# Patient Record
Sex: MALE | Race: WHITE | NOT HISPANIC OR LATINO | Employment: FULL TIME | URBAN - METROPOLITAN AREA
[De-identification: names, ages, dates, MRNs, and addresses within clinical notes are randomized per-mention and may not be internally consistent; named-entity substitution may affect disease eponyms.]

---

## 2021-11-05 ENCOUNTER — TRANSFERRED RECORDS (OUTPATIENT)
Dept: HEALTH INFORMATION MANAGEMENT | Facility: CLINIC | Age: 26
End: 2021-11-05
Payer: OTHER MISCELLANEOUS

## 2021-11-06 ENCOUNTER — MEDICAL CORRESPONDENCE (OUTPATIENT)
Dept: HEALTH INFORMATION MANAGEMENT | Facility: CLINIC | Age: 26
End: 2021-11-06
Payer: OTHER MISCELLANEOUS

## 2021-11-07 ENCOUNTER — MEDICAL CORRESPONDENCE (OUTPATIENT)
Dept: HEALTH INFORMATION MANAGEMENT | Facility: CLINIC | Age: 26
End: 2021-11-07
Payer: OTHER MISCELLANEOUS

## 2021-11-08 ENCOUNTER — MEDICAL CORRESPONDENCE (OUTPATIENT)
Dept: HEALTH INFORMATION MANAGEMENT | Facility: CLINIC | Age: 26
End: 2021-11-08
Payer: OTHER MISCELLANEOUS

## 2021-11-17 ENCOUNTER — TELEPHONE (OUTPATIENT)
Dept: OPHTHALMOLOGY | Facility: CLINIC | Age: 26
End: 2021-11-17
Payer: OTHER MISCELLANEOUS

## 2021-11-17 ENCOUNTER — TELEPHONE (OUTPATIENT)
Dept: OPHTHALMOLOGY | Facility: CLINIC | Age: 26
End: 2021-11-17

## 2021-11-17 NOTE — TELEPHONE ENCOUNTER
Dr. Fuller at [facility] was scheduled to see pt this afternoon.    Pt was seen at [facility] in [place] November 5th in Emergency department and November 8th by oculo plastics    -- orbital fracture and lacrimal gland injury    Dr. Fuller recommends oculoplastics for exam.    Notes were to be faxed to [phone]      Spoke to pt at 1330 who was aware of scheduling recommendation changes    CT performed at [facility] per pt    Reviewed would forward to care coordinator to assist in review of image/referral and assist in scheduling with Oculo-plastics    Pt/wife seemed comfortable with plan    Domingo Corley, RN 1:45 PM 11/17/21

## 2021-11-17 NOTE — TELEPHONE ENCOUNTER
John,  Please see notes below, states notes are being faxed but not sure if that's from Dr. Fuller or from [facility]. Can you please request notes and images to be pushed from [facility]? Let me know when received so I can review and see how urgently patient needs to be seen.  Thank you,  Mary Harry RN RN 2:23 PM 11/17/21

## 2021-11-17 NOTE — TELEPHONE ENCOUNTER
"Spoke with patient's wife regarding scheduling. Scheduled patient for 11/22/21 for: \"Fall 11/5/21 orb fx, [facility], notes in care everywhere/images in PACS\". Eye was swollen shut and patient was canelled at other Clinic and referred here for Evaluation. Provided appointment information over the phone.-Per Patient's wife  " Negative

## 2021-11-17 NOTE — TELEPHONE ENCOUNTER
"Please call to schedule in 3 weeks w/ Oculoplastics, patient was also seen at [facility] in [place] and can follow up there if they want sooner appointment. \"Fall 11/5/21 orb fx, seen at [facility], notes in care everywhere/images in PACS\"    Thank you!  Mary Harry RN RN 2:51 PM 11/17/21    "

## 2021-11-17 NOTE — TELEPHONE ENCOUNTER
Called and LVM for Junior to call me @ [phone] to update insurance and demographics.     Eloisa Crandall Communication Facilitator on 11/17/2021 at 1:21 PM

## 2021-11-18 NOTE — TELEPHONE ENCOUNTER
FUTURE VISIT INFORMATION      FUTURE VISIT INFORMATION:    Date: 11/22    Time: 1:45pm    Location: [facility]  REFERRAL INFORMATION:    Referring providers clinic:  [facility]    Reason for visit/diagnosis  Fall 11/5/21 orb fx, Patient was only seen in ED at [facility]    RECORDS REQUESTED FROM:       Clinic name Comments Records Status Imaging Status   [facility] Ov/referral 11/17/21 Care Everywhere    [facility] Ed Visit 11/5/21  CT Head 11/6/21  CT Maxillofacial 11/5/21 Care Everywhere PAC

## 2021-11-22 ENCOUNTER — OFFICE VISIT (OUTPATIENT)
Dept: OPHTHALMOLOGY | Facility: CLINIC | Age: 26
End: 2021-11-22
Payer: OTHER MISCELLANEOUS

## 2021-11-22 ENCOUNTER — PRE VISIT (OUTPATIENT)
Dept: OPHTHALMOLOGY | Facility: CLINIC | Age: 26
End: 2021-11-22

## 2021-11-22 VITALS — WEIGHT: 170 LBS | HEIGHT: 67 IN | BODY MASS INDEX: 26.68 KG/M2

## 2021-11-22 DIAGNOSIS — S02.121A: Primary | ICD-10-CM

## 2021-11-22 PROCEDURE — 92285 EXTERNAL OCULAR PHOTOGRAPHY: CPT | Performed by: OPHTHALMOLOGY

## 2021-11-22 PROCEDURE — 99203 OFFICE O/P NEW LOW 30 MIN: CPT | Mod: GC | Performed by: OPHTHALMOLOGY

## 2021-11-22 RX ORDER — METHOCARBAMOL 500 MG/1
TABLET, FILM COATED ORAL
COMMUNITY
Start: 2021-11-12

## 2021-11-22 RX ORDER — ACETAMINOPHEN 500 MG
1000 TABLET ORAL
COMMUNITY
Start: 2021-11-07

## 2021-11-22 RX ORDER — GINSENG 100 MG
1 CAPSULE ORAL
COMMUNITY
Start: 2021-11-07

## 2021-11-22 RX ORDER — BRIMONIDINE TARTRATE 2 MG/ML
1 SOLUTION/ DROPS OPHTHALMIC
COMMUNITY
Start: 2021-11-07

## 2021-11-22 ASSESSMENT — REFRACTION_WEARINGRX
OD_CYLINDER: +0.50
OS_CYLINDER: +0.25
OS_SPHERE: -2.50
OS_AXIS: 080
OD_SPHERE: -3.00
OD_AXIS: 097
SPECS_TYPE: SVL

## 2021-11-22 ASSESSMENT — VISUAL ACUITY
OS_CC: J1+
CORRECTION_TYPE: GLASSES
OS_PH_CC+: -3
OS_PH_CC: 20/20
OD_CC: 20/25
OD_CC: J1+
OS_CC: 20/25
OS_CC+: -1
METHOD: SNELLEN - LINEAR

## 2021-11-22 ASSESSMENT — CONF VISUAL FIELD
OD_NORMAL: 1
METHOD: COUNTING FINGERS
OS_NORMAL: 1

## 2021-11-22 ASSESSMENT — TONOMETRY
OS_IOP_MMHG: 11
OD_IOP_MMHG: 10
IOP_METHOD: ICARE

## 2021-11-22 ASSESSMENT — SLIT LAMP EXAM - LIDS
COMMENTS: NORMAL
COMMENTS: NORMAL

## 2021-11-22 ASSESSMENT — MIFFLIN-ST. JEOR: SCORE: 1699.74

## 2021-11-22 ASSESSMENT — CUP TO DISC RATIO
OD_RATIO: 0.2
OS_RATIO: 0.2

## 2021-11-22 NOTE — PROGRESS NOTES
Chief Complaints and History of Present Illnesses   Patient presents with     Consult For     Orbital Fracture.      Chief Complaint(s) and History of Present Illness(es)     Consult For     In right eye.  Associated symptoms include eye pain and redness.    Negative for tearing, swelling and discharge.  Treatments tried include   eye drops.  Pain was noted as 1/10. Additional comments: Orbital Fracture.                 Comments     Patient reports that this is a workers compensation injury. Fell through   attic garage about 10 feet hitting concrete on 11/5/21. The right eye was   swollen shut so had to wait for swelling to go down. Right eye was blurry   and vision each eye double lasting 3-4 days. Feels like vision right eye   is back to what it was but difficult to assess with glasses scratched. Had   to wait for right eye swelling to go down for exam. Not having issues with   discharge or light sensitivity. Still has what appears to be blood in the   peripheral corner of right eye. This was much greater but has been   resolving as well as brusing all around right eye that has resolved.   Patient is present today with wife. 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   Minimal amount of pain with right eye just aware of it when staring at   computer screen too long.     Brimonidine BID left eye right eye 915 am.    Jaquelin Cha, COT COT 1:52 PM November 22, 2021       Assessment & Plan     Junior Sandoval is a 26 year old male with the following diagnoses:   1. Closed fracture of roof of right orbit, initial encounter (H)           Right orbital roof fracture  - diplopia has resolved, full EOMs  - IOP wnl normal limit - can stop alphagan right eye  - will dispense new MRx today  - follow up 3-4 weeks for IOP check off of alphagan with optometry for full eval and manifest refraction             Erin Steinberg MD  Oculoplastics Fellow    Attending Physician Attestation:  I have seen and examined this  patient with the fellow .  I have confirmed and edited as necessary the chief complaint(s), history of present illness, review of systems, relevant history, and examination findings as documented by others.  I have personally reviewed the relevant tests, images, and reports as documented above.  I have confirmed and edited as necessary the assessment and plan and agree with this note.    - Mario Horn MD 2:32 PM 11/22/2021

## 2021-11-22 NOTE — NURSING NOTE
Chief Complaints and History of Present Illnesses   Patient presents with     Consult For     Orbital Fracture.      Chief Complaint(s) and History of Present Illness(es)     Consult For     Laterality: right eye    Comments: Orbital Fracture.               Comments     Patient reports that this is a workers compensation injury. Fell through attic garage about 10 feet hitting concrete on 11/5/21. The right eye was swollen shut so had to wait for swelling to go down. Right eye was blurry and vision each eye double lasting 3-4 days. Feels like vision right eye is back to what it was but difficult to assess with glasses scratched. Had to wait for right eye swelling to go down for exam. Not having issues with discharge or light sensitivity. Still has what appears to be blood in the peripheral corner of right eye. This was much greater but has been resolving as well as brusing all around right eye that has resolved.   Patient is present today with wife. 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     Brimonidine BID left eye right eye 915 am.    Jaquelin Cha, COT COT 1:52 PM November 22, 2021         .

## 2021-11-22 NOTE — NURSING NOTE
Chief Complaints and History of Present Illnesses   Patient presents with     Consult For     Orbital Fracture.      Chief Complaint(s) and History of Present Illness(es)     Consult For     Laterality: right eye    Associated symptoms: eye pain and redness.  Negative for tearing, swelling and discharge    Treatments tried: eye drops    Pain scale: 1/10    Comments: Orbital Fracture.               Comments     Patient reports that this is a workers compensation injury. Fell through attic garage about 10 feet hitting concrete on 11/5/21. The right eye was swollen shut so had to wait for swelling to go down. Right eye was blurry and vision each eye double lasting 3-4 days. Feels like vision right eye is back to what it was but difficult to assess with glasses scratched. Had to wait for right eye swelling to go down for exam. Not having issues with discharge or light sensitivity. Still has what appears to be blood in the peripheral corner of right eye. This was much greater but has been resolving as well as brusing all around right eye that has resolved.   Patient is present today with wife. 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   Minimal amount of pain with right eye just aware of it when staring at computer screen too long.     Brimonidine BID left eye right eye 915 am.    ANGELITA Dow COT 1:52 PM November 22, 2021         .

## 2021-11-29 ENCOUNTER — TELEPHONE (OUTPATIENT)
Dept: OPHTHALMOLOGY | Facility: CLINIC | Age: 26
End: 2021-11-29
Payer: OTHER MISCELLANEOUS

## 2021-11-29 NOTE — TELEPHONE ENCOUNTER
FUTURE VISIT INFORMATION      FUTURE VISIT INFORMATION:    Date: 1/10/22    Time: 8:20am    Location: [facility]  REFERRAL INFORMATION:    Referring provider:  Kory    Referring providers clinic:  [facility]    Reason for visit/diagnosis  follow up 3-4 weeks for IOP check off of alphagan with optometry for full eval and manifest refraction    RECORDS REQUESTED FROM:       Clinic name Comments Records Status Imaging Status   [facility] Ov/referral 11/24 EPIC

## 2021-11-29 NOTE — TELEPHONE ENCOUNTER
"Spoke with patient regarding scheduling a Follow up with General optom, \"follow up 3-4 weeks for IOP check off of alphagan with optometry for full eval and manifest refraction\" . Scheduled patient accordingly and sent appointment letter to confirmed address.-Per Patient   "

## 2022-01-09 NOTE — PROGRESS NOTES
HPI  Junior Sandoval is a 26 year oldmale here for follow-up after right orbital roof fracture after a fall at work on 11/7/21.  Per patient and outside record review from [facility], he had diplopia, mild blurry vision with visual acuity of 20/40 right eye, and ocular hypertension in the 30s which was treated with brimonidine.  He saw Dr. Horn in oculoplastics and did not require fracture repair.  Junior feels that the right eye is back to baseline now.  Feels vision is likely at baseline except that his vision is mildly blurry through his glasses which are severely scratched.  Denies flashes/floaters, photophobia or foreign body sensation or diplopia.       PMH: History reviewed. No pertinent past medical history.   POH:  Glasses for myopia, orbital roof fracture right eye 11/2021, ocular hyptertension status-post blunt trauma treatment with brimonidine- has been off now.  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    (H52.13) Myopia, bilateral - Both Eyes    Comment: good visual acuity with manifest refraction   Plan: manifest refraction done and prescription for glasses given     Patient  disposition:   Return to clinic in  Return in about 6 months (around 7/10/2022) for ho blunt trauma OD, orbital fract OD:  Octopus 24-2, don't dilate, gonio, iop check.  To call sooner as needed for sooner appointment.    Complete documentation of historical and exam elements from today's encounter can be found in the full encounter summary report (not reduplicated in this progress note). I personally obtained the chief complaint(s) and history of present illness.  I have confirmed and edited as necessary the CC, HPI, PMH/PSH, social history, FMH, ROS, and exam/neuro findings as obtained by the technician or others. I have examined this patient myself and I personally viewed the image(s) and studies listed above and the documentation reflects my findings and interpretation.  I formulated and edited as necessary the assessment and plan and discussed the findings and management plan with the patient and family.     Olivia Nuno MD

## 2022-01-10 ENCOUNTER — OFFICE VISIT (OUTPATIENT)
Dept: OPHTHALMOLOGY | Facility: CLINIC | Age: 27
End: 2022-01-10
Payer: OTHER MISCELLANEOUS

## 2022-01-10 ENCOUNTER — PRE VISIT (OUTPATIENT)
Dept: OPHTHALMOLOGY | Facility: CLINIC | Age: 27
End: 2022-01-10

## 2022-01-10 DIAGNOSIS — H52.13 MYOPIA, BILATERAL: ICD-10-CM

## 2022-01-10 DIAGNOSIS — S02.85XD: Primary | ICD-10-CM

## 2022-01-10 DIAGNOSIS — H40.051 BORDERLINE GLAUCOMA OF RIGHT EYE WITH OCULAR HYPERTENSION: ICD-10-CM

## 2022-01-10 PROCEDURE — 92015 DETERMINE REFRACTIVE STATE: CPT | Performed by: OPHTHALMOLOGY

## 2022-01-10 PROCEDURE — 92014 COMPRE OPH EXAM EST PT 1/>: CPT | Performed by: OPHTHALMOLOGY

## 2022-01-10 RX ORDER — CYCLOBENZAPRINE HCL 10 MG
TABLET ORAL
COMMUNITY
Start: 2021-12-07

## 2022-01-10 RX ORDER — OXYCODONE HYDROCHLORIDE 5 MG/1
5 TABLET ORAL
COMMUNITY
Start: 2021-11-07

## 2022-01-10 ASSESSMENT — TONOMETRY
OD_IOP_MMHG: 17
OS_IOP_MMHG: 14
IOP_METHOD: TONOPEN

## 2022-01-10 ASSESSMENT — VISUAL ACUITY
METHOD: SNELLEN - LINEAR
OS_CC: 20/25
OD_CC: 20/30
CORRECTION_TYPE: GLASSES

## 2022-01-10 ASSESSMENT — CUP TO DISC RATIO
OD_RATIO: 0.2
OS_RATIO: 0.2

## 2022-01-10 ASSESSMENT — REFRACTION_MANIFEST
OS_AXIS: 082
OD_CYLINDER: +0.50
OD_SPHERE: -2.00
OS_SPHERE: -2.25
OD_AXIS: 122
OS_CYLINDER: +0.75

## 2022-01-10 ASSESSMENT — CONF VISUAL FIELD
OS_NORMAL: 1
OD_NORMAL: 1
METHOD: COUNTING FINGERS

## 2022-01-10 ASSESSMENT — SLIT LAMP EXAM - LIDS: COMMENTS: NORMAL

## 2022-01-10 ASSESSMENT — REFRACTION_WEARINGRX
OS_SPHERE: -2.50
OD_AXIS: 097
OD_CYLINDER: +0.50
OS_AXIS: 080
OD_SPHERE: -3.00
OS_CYLINDER: +0.25
SPECS_TYPE: SVL

## 2022-01-10 NOTE — NURSING NOTE
Chief Complaints and History of Present Illnesses   Patient presents with     COMPREHENSIVE EYE EXAM     Chief Complaint(s) and History of Present Illness(es)     COMPREHENSIVE EYE EXAM     Laterality: both eyes    Associated symptoms: Negative for floaters, flashes, tearing, dryness and eye pain    Treatments tried: no treatments    Pain scale: 0/10              Comments     Pt notes that he has been off alphagan gtts about 2 months ago, here to make sure IOP is normal, diplopia has resolved p orbital fx. Having blurred VA due to fall and lenses of gls scratched.     Ernestina Aquino COT January 10, 2022 8:25 AM